# Patient Record
Sex: FEMALE | Race: WHITE
[De-identification: names, ages, dates, MRNs, and addresses within clinical notes are randomized per-mention and may not be internally consistent; named-entity substitution may affect disease eponyms.]

---

## 2020-09-04 ENCOUNTER — HOSPITAL ENCOUNTER (EMERGENCY)
Dept: HOSPITAL 97 - ER | Age: 40
Discharge: HOME | End: 2020-09-04
Payer: SELF-PAY

## 2020-09-04 DIAGNOSIS — F17.210: ICD-10-CM

## 2020-09-04 DIAGNOSIS — R60.9: Primary | ICD-10-CM

## 2020-09-04 LAB
ALBUMIN SERPL BCP-MCNC: 3.1 G/DL (ref 3.4–5)
ALP SERPL-CCNC: 68 U/L (ref 45–117)
ALT SERPL W P-5'-P-CCNC: 22 U/L (ref 12–78)
AST SERPL W P-5'-P-CCNC: 13 U/L (ref 15–37)
BUN BLD-MCNC: 10 MG/DL (ref 7–18)
GLUCOSE SERPLBLD-MCNC: 176 MG/DL (ref 74–106)
HCT VFR BLD CALC: 38.5 % (ref 36–45)
INR BLD: 0.91
LYMPHOCYTES # SPEC AUTO: 2.5 K/UL (ref 0.7–4.9)
MAGNESIUM SERPL-MCNC: 1.8 MG/DL (ref 1.8–2.4)
NT-PROBNP SERPL-MCNC: 107 PG/ML (ref ?–125)
PMV BLD: 8.5 FL (ref 7.6–11.3)
POTASSIUM SERPL-SCNC: 3.9 MMOL/L (ref 3.5–5.1)
RBC # BLD: 4.54 M/UL (ref 3.86–4.86)
TROPONIN (EMERG DEPT USE ONLY): < 0.02 NG/ML (ref 0–0.04)

## 2020-09-04 PROCEDURE — 84484 ASSAY OF TROPONIN QUANT: CPT

## 2020-09-04 PROCEDURE — 85025 COMPLETE CBC W/AUTO DIFF WBC: CPT

## 2020-09-04 PROCEDURE — 80076 HEPATIC FUNCTION PANEL: CPT

## 2020-09-04 PROCEDURE — 71045 X-RAY EXAM CHEST 1 VIEW: CPT

## 2020-09-04 PROCEDURE — 83735 ASSAY OF MAGNESIUM: CPT

## 2020-09-04 PROCEDURE — 99284 EMERGENCY DEPT VISIT MOD MDM: CPT

## 2020-09-04 PROCEDURE — 36415 COLL VENOUS BLD VENIPUNCTURE: CPT

## 2020-09-04 PROCEDURE — 83880 ASSAY OF NATRIURETIC PEPTIDE: CPT

## 2020-09-04 PROCEDURE — 80048 BASIC METABOLIC PNL TOTAL CA: CPT

## 2020-09-04 PROCEDURE — 85610 PROTHROMBIN TIME: CPT

## 2020-09-04 PROCEDURE — 93005 ELECTROCARDIOGRAM TRACING: CPT

## 2020-09-04 NOTE — ER
Nurse's Notes                                                                                     

 [facility]                                                                 

Name: Zuleika Ybarra                                                                              

Age: 39 yrs                                                                                       

Sex: Female                                                                                       

: 1980                                                                                   

MRN: [id number]                                                                                   

Arrival Date: 2020                                                                          

Time: 11:20                                                                                       

Account#: [id number]                                                                            

Bed 17                                                                                            

Private MD:                                                                                       

Diagnosis: Edema, unspecified                                                                     

                                                                                                  

Presentation:                                                                                     

                                                                                             

11:27 Chief complaint: Patient states: report "I'm having swelling all over my body. My legs  sv  

      are red as well." Reports the clinic put her on a diuretic and today she took her last      

      pill. Reports headache and intermittent blurry vision x 2 weeks. Risk Assessment: Do        

      you want to hurt yourself or someone else? Patient reports no desire to harm self or        

      others. Onset of symptoms was 2020.                                                  

11:27 Method Of Arrival: Ambulatory                                                           sv  

11:27 Acuity: ASHLEY 3                                                                           sv  

11:30 Coronavirus screen: Client denies travel out of the [place] in the last 14 days. At this   sv  

      time, the client does not indicate any symptoms associated with coronavirus-19. Ebola       

      Screen: No symptoms or risks identified at this time. Initial Sepsis Screen: Does the       

      patient meet any 2 criteria? RR > 20 per min. No. Patient's initial sepsis screen is        

      negative. Does the patient have a suspected source of infection? No. Patient's initial      

      sepsis screen is negative.                                                                  

                                                                                                  

Triage Assessment:                                                                                

11:27 General: Appears in no apparent distress. uncomfortable, Behavior is calm, cooperative, sv  

      appropriate for age. Pain:. Neuro: Level of Consciousness is awake, alert, obeys            

      commands, Oriented to person, place, time, situation, Gait is steady, Speech is normal.     

      Respiratory: Respiratory effort is even, unlabored. Derm: Skin is pink, warm \T\ dry.       

      Musculoskeletal: Reports swelling all over.                                                 

                                                                                                  

OB/GYN:                                                                                           

14:00 LMP 2020                                                                            ca1 

                                                                                                  

Historical:                                                                                       

- Allergies:                                                                                      

11:30 No Known Allergies;                                                                     sv  

- PMHx:                                                                                           

11:30 Bipolar disorder; Depression; Nerve damage; Arthritis; Psoriasis;                       sv  

- PSHx:                                                                                           

11:30 ; left 1st digit; Tonsillectomy;                                               sv  

                                                                                                  

- Immunization history:: Adult Immunizations up to date.                                          

- Social history:: Smoking status: Patient reports the use of cigarette tobacco                   

  products, smokes one-half pack cigarettes per day.                                              

                                                                                                  

                                                                                                  

Screenin:25 Abuse screen: Denies threats or abuse. Denies injuries from another. Nutritional        ca1 

      screening: No deficits noted. Tuberculosis screening: No symptoms or risk factors           

      identified.                                                                                 

13:25 Fall Risk IV access (20 points).                                                        ca1 

                                                                                                  

Assessment:                                                                                       

13:25 General: Appears in no apparent distress. comfortable, Behavior is calm, cooperative,   ca1 

      appropriate for age. Pain: Denies pain. Neuro: Level of Consciousness is awake, alert,      

      obeys commands, Oriented to person, place, time, situation. Cardiovascular: Heart tones     

      S1 S2 present Capillary refill < 3 seconds Patient's skin is warm and dry. Pulses are       

      all present. Cardiovascular: Parent/caregiver reports patient has had shortness of          

      breath, swelling on both lower extremities for about 2-3 weeks. Respiratory: Airway is      

      patent Respiratory effort is even, unlabored, Respiratory pattern is regular,               

      symmetrical, Breath sounds are clear bilaterally. Respiratory: Reports shortness of         

      breath on exertion and at night while laying flat. GI: Abdomen is round non-distended,      

      Bowel sounds present X 4 quads. Abd is soft and non tender X 4 quads. GI: No signs          

      and/or symptoms were reported involving the gastrointestinal system. : No signs           

      and/or symptoms were reported regarding the genitourinary system. EENT: No signs and/or     

      symptoms were reported regarding the EENT system. Derm: Skin is intact, is healthy with     

      good turgor, Skin is pink, warm \T\ dry. Musculoskeletal: Circulation, motion, and          

      sensation intact. Capillary refill < 3 seconds, Swelling present in right leg and left      

      leg, bilateral ankles, pt reports it is coming up her legs.                                 

14:30 Reassessment: Patient appears in no apparent distress at this time. Patient and/or      ca1 

      family updated on plan of care and expected duration. Pain level reassessed. Patient is     

      alert, oriented x 3, equal unlabored respirations, skin warm/dry/pink.                      

                                                                                                  

Vital Signs:                                                                                      

11:30  / 90; Pulse 90; Resp 22; Temp 98(TE); Pulse Ox 100% on R/A; Weight 99.79 kg;     sv  

      Height 5 ft. 7 in. (170.18 cm);                                                             

14:59  / 84; Pulse 82; Resp 14; Pulse Ox 97% on R/A;                                    ss  

11:30 Body Mass Index 34.46 (99.79 kg, 170.18 cm)                                             sv  

                                                                                                  

ED Course:                                                                                        

11:20 Patient arrived in ED.                                                                  as  

11:27 Arm band placed on.                                                                     sv  

11:29 Triage completed.                                                                       sv  

12:22 [facility]                                                        kb  

12:22 Montana Lee MD is Attending Physician.                                             kb  

12:47 Jeanna Velez, RN is Primary Nurse.                                                      ca1 

13:25 Patient has correct armband on for positive identification. Placed in gown. Bed in low  ca1 

      position. Call light in reach. Side rails up X2. Cardiac monitor on. Pulse ox on. NIBP      

      on. Warm blanket given.                                                                     

13:35 Missed attempt(s): 20 gauge in right antecubital area. Bleeding controlled, band aid    ca1 

      applied, catheter tip intact.                                                               

13:40 Missed attempt(s): 20 gauge in left antecubital area. Bleeding controlled, band aid     ca1 

      applied, catheter tip intact.                                                               

13:45 No provider procedures requiring assistance completed. Initial lab(s) drawn, by me,     ca1 

      sent to lab. Inserted saline lock: 22 gauge in right wrist, using aseptic technique.        

      Blood collected.                                                                            

13:54 XRAY Chest (1 view) In Process Unspecified.                                             EDMS

15:00 IV discontinued, intact, bleeding controlled, No redness/swelling at site. Pressure     ss  

      dressing applied.                                                                           

                                                                                                  

Administered Medications:                                                                         

No medications were administered                                                                  

                                                                                                  

                                                                                                  

Outcome:                                                                                          

14:47 Discharge ordered by MD.                                                                kb  

15:00 Discharged to home ambulatory.                                                          ss  

15:00 Condition: good                                                                             

15:00 Discharge instructions given to patient, Instructed on discharge instructions, follow       

      up and referral plans. Demonstrated understanding of instructions, follow-up care.          

15:01 Patient left the ED.                                                                    ss  

                                                                                                  

Signatures:                                                                                       

Dispatcher [employer]                           EDMS                                                 

Michelle Ybarra FNP-C FNP-Ckb Verde, Stephanie, RN RN sv Martinez, Amelia as Smirch, Shelby, RN RN                                                      

Jeanna Velez, RN                        RN   ca1                                                  

                                                                                                  

Corrections: (The following items were deleted from the chart)                                    

11:33 11:30  / 00; Pulse 90bpm; Resp 22bpm; Pulse Ox 100% RA; Temp 98F Temporal; 99.79  sv  

      kg; Height 5 ft. 7 in.; BMI: 34.4; sv                                                       

                                                                                                  

**************************************************************************************************

## 2020-09-04 NOTE — RAD REPORT
EXAM DESCRIPTION:  Sarah Single View9/4/2020 1:54 pm

 

CLINICAL HISTORY:  Shortness of breath

 

COMPARISON:  none

 

FINDINGS:   The lungs appear clear of acute infiltrate. The heart is normal size

 

IMPRESSION:   No acute abnormalities displayed

## 2020-09-04 NOTE — EDPHYS
Physician Documentation                                                                           

 [facility]                                                                 

Name: Zulieka Ybarra                                                                              

Age: 39 yrs                                                                                       

Sex: Female                                                                                       

: 1980                                                                                   

MRN: [id number]                                                                                   

Arrival Date: 2020                                                                          

Time: 11:20                                                                                       

Account#: [id number]                                                                            

Bed 17                                                                                            

Private MD:                                                                                       

NAMRATA Physician Montana Lee                                                                      

HPI:                                                                                              

                                                                                             

14:59 This 39 yrs old  Female presents to ER via Ambulatory with complaints of Leg   kb  

      Swelling.                                                                                   

14:59 Pt reports she has had swelling to entire body for 3 weeks. States it is better in the  kb  

      mornings. . Onset: The symptoms/episode began/occurred 3 week(s) ago. Severity of           

      symptoms: At their worst the symptoms were mild moderate in the emergency department        

      the symptoms are unchanged. The patient has not experienced similar symptoms in the         

      past. The patient has not recently seen a physician.                                        

                                                                                                  

OB/GYN:                                                                                           

14:00 LMP 2020                                                                            ca1 

                                                                                                  

Historical:                                                                                       

- Allergies:                                                                                      

11:30 No Known Allergies;                                                                     sv  

- PMHx:                                                                                           

11:30 Bipolar disorder; Depression; Nerve damage; Arthritis; Psoriasis;                       sv  

- PSHx:                                                                                           

11:30 ; left 1st digit; Tonsillectomy;                                               sv  

                                                                                                  

- Immunization history:: Adult Immunizations up to date.                                          

- Social history:: Smoking status: Patient reports the use of cigarette tobacco                   

  products, smokes one-half pack cigarettes per day.                                              

                                                                                                  

                                                                                                  

ROS:                                                                                              

14:58 Constitutional: Negative for fever, chills, and weight loss, Neck: Negative for injury, kb  

      pain, and swelling, Cardiovascular: Negative for chest pain, palpitations. +edema           

      Abdomen/GI: Negative for abdominal pain, nausea, vomiting, diarrhea, and constipation,      

      Back: Negative for injury and pain, MS/Extremity: Negative for injury and deformity,        

      Skin: Negative for injury, rash, and discoloration, Neuro: Negative for headache,           

      weakness, numbness, tingling, and seizure.                                                  

14:58 Respiratory: Positive for shortness of breath, Negative for cough, dyspnea on exertion,     

      hemoptysis, orthopnea, pleurisy, sputum production, wheezing.                               

                                                                                                  

Exam:                                                                                             

14:58 Constitutional:  This is a well developed, well nourished patient who is awake, alert,  kb  

      and in no acute distress. Head/Face:  Normocephalic, atraumatic. Chest/axilla:  Normal      

      chest wall appearance and motion.  Nontender with no deformity.  No lesions are             

      appreciated. Cardiovascular:  Regular rate and rhythm with a normal S1 and S2.  No          

      gallops, murmurs, or rubs.  Normal PMI, no JVD.  No pulse deficits. Respiratory:  Lungs     

      have equal breath sounds bilaterally, clear to auscultation and percussion.  No rales,      

      rhonchi or wheezes noted.  No increased work of breathing, no retractions or nasal          

      flaring. Abdomen/GI:  Soft, non-tender, with normal bowel sounds.  No distension or         

      tympany.  No guarding or rebound.  No evidence of tenderness throughout. Skin:  Warm,       

      dry with normal turgor.  Normal color with no rashes, no lesions, and no evidence of        

      cellulitis. MS/ Extremity:  Pulses equal, no cyanosis.  Neurovascular intact.  Full,        

      normal range of motion. Neuro:  Awake and alert, GCS 15, oriented to person, place,         

      time, and situation.  Cranial nerves II-XII grossly intact.  Motor strength 5/5 in all      

      extremities.  Sensory grossly intact.  Cerebellar exam normal.  Normal gait.                

14:58 ECG was reviewed by the Attending Physician.                                                

                                                                                                  

Vital Signs:                                                                                      

11:30  / 90; Pulse 90; Resp 22; Temp 98(TE); Pulse Ox 100% on R/A; Weight 99.79 kg;     sv  

      Height 5 ft. 7 in. (170.18 cm);                                                             

14:59  / 84; Pulse 82; Resp 14; Pulse Ox 97% on R/A;                                    ss  

11:30 Body Mass Index 34.46 (99.79 kg, 170.18 cm)                                             sv  

                                                                                                  

MDM:                                                                                              

12:44 Patient medically screened.                                                             kb  

14:59 Data reviewed: vital signs, nurses notes. Data interpreted: Pulse oximetry: on room air kb  

      is 100 %. Interpretation: normal.                                                           

15:00 Counseling: I had a detailed discussion with the patient and/or guardian regarding: the kb  

      historical points, exam findings, and any diagnostic results supporting the                 

      discharge/admit diagnosis, lab results, radiology results, the need for outpatient          

      follow up, a family practitioner, to return to the emergency department if symptoms         

      worsen or persist or if there are any questions or concerns that arise at home.             

                                                                                                  

                                                                                             

13:23 Order name: Basic Metabolic Panel; Complete Time: 14:20                                 kb  

                                                                                             

13:23 Order name: CBC with Diff; Complete Time: 13:59                                         kb  

                                                                                             

13:23 Order name: LFT's; Complete Time: 14:20                                                 kb  

                                                                                             

13:23 Order name: Magnesium; Complete Time: 14:20                                             kb  

                                                                                             

13:23 Order name: NT PRO-BNP; Complete Time: 14:20                                            kb  

                                                                                             

13:23 Order name: PT-INR; Complete Time: 13:59                                                kb  

                                                                                             

13:23 Order name: Troponin (emerg Dept Use Only); Complete Time: 14:20                        kb  

                                                                                             

13:23 Order name: XRAY Chest (1 view); Complete Time: 14:37                                   kb  

                                                                                             

13:23 Order name: EKG; Complete Time: 13:23                                                   kb  

                                                                                             

13:23 Order name: Cardiac monitoring; Complete Time: 14:06                                    kb  

                                                                                             

13:23 Order name: EKG - Nurse/Tech; Complete Time: 14:06                                      kb  

                                                                                             

13:23 Order name: IV Saline Lock; Complete Time: 13:55                                        kb  

                                                                                             

13:23 Order name: Labs collected and sent; Complete Time: 13:55                               kb  

                                                                                             

13:23 Order name: O2 Per Protocol; Complete Time: 13:55                                       kb  

                                                                                             

13:23 Order name: O2 Sat Monitoring; Complete Time: 13:55                                     kb  

                                                                                                  

EC:58 Rate is 78 beats/min. Rhythm is regular. QRS Axis is Normal. ID interval is normal at   kb  

      152 msec. QRS interval is normal at 88 msec. QT interval is normal at 416 msec.             

                                                                                                  

Administered Medications:                                                                         

No medications were administered                                                                  

                                                                                                  

                                                                                                  

Disposition:                                                                                      

                                                                                             

11:11 Co-signature as Attending Physician, Montana Lee MD I agree with the assessment and  fabiola 

      plan of care.                                                                               

                                                                                                  

Disposition:                                                                                      

20 14:47 Discharged to Home. Impression: Edema, unspecified.                                

- Condition is Stable.                                                                            

- Discharge Instructions: Edema, Easy-to-Read, Peripheral Edema.                                  

                                                                                                  

- Medication Reconciliation Form, Thank You Letter, Antibiotic Education, Prescription            

  Opioid Use form.                                                                                

- Follow up: Emergency Department; When: As needed; Reason: Worsening of condition.               

  Follow up: Private Physician; When: 2 - 3 days; Reason: Recheck today's complaints,             

  Continuance of care, Re-evaluation by your physician.                                           

                                                                                                  

                                                                                                  

                                                                                                  

Signatures:                                                                                       

Dispatcher [employer]                           Michelle Suggs FNP-C FNP-Ckb Verde, Stephanie, RN RN sv Anderson, Corey, MD MD cha Smirch, Shelby RN                      RN   ss                                                   

                                                                                                  

Corrections: (The following items were deleted from the chart)                                    

                                                                                             

15:01 14:47 2020 14:47 Discharged to Home. Impression: Edema, unspecified. Condition is ss  

      Stable. Forms are Medication Reconciliation Form, Thank You Letter, Antibiotic              

      Education, Prescription Opioid Use. Follow up: Emergency Department; When: As needed;       

      Reason: Worsening of condition. Follow up: Private Physician; When: 2 - 3 days; Reason:     

      Recheck today's complaints, Continuance of care, Re-evaluation by your physician. kb        

                                                                                                  

**************************************************************************************************

## 2020-09-05 VITALS — SYSTOLIC BLOOD PRESSURE: 112 MMHG | DIASTOLIC BLOOD PRESSURE: 84 MMHG | OXYGEN SATURATION: 97 %

## 2020-09-05 VITALS — TEMPERATURE: 98 F

## 2020-09-05 NOTE — EKG
Test Date:    2020-09-04               Test Time:    14:01:18

Technician:   STEVE                                    

                                                     

MEASUREMENT RESULTS:                                       

Intervals:                                           

Rate:         78                                     

NH:           152                                    

QRSD:         88                                     

QT:           416                                    

QTc:          474                                    

Axis:                                                

P:            68                                     

NH:           152                                    

QRS:          68                                     

T:            62                                     

                                                     

INTERPRETIVE STATEMENTS:                                       

                                                     

Normal sinus rhythm

Normal ECG

No previous ECG available for comparison



Electronically Signed On 09-05-20 09:00:01 CDT by Marco A Pressley